# Patient Record
Sex: FEMALE | Race: WHITE | NOT HISPANIC OR LATINO | Employment: OTHER | URBAN - METROPOLITAN AREA
[De-identification: names, ages, dates, MRNs, and addresses within clinical notes are randomized per-mention and may not be internally consistent; named-entity substitution may affect disease eponyms.]

---

## 2017-10-16 ENCOUNTER — NEW PATIENT COMPREHENSIVE (OUTPATIENT)
Dept: URBAN - METROPOLITAN AREA CLINIC 43 | Facility: CLINIC | Age: 82
End: 2017-10-16

## 2017-10-16 DIAGNOSIS — H43.813: ICD-10-CM

## 2017-10-16 DIAGNOSIS — H35.3131: ICD-10-CM

## 2017-10-16 PROCEDURE — G8427 DOCREV CUR MEDS BY ELIG CLIN: HCPCS

## 2017-10-16 PROCEDURE — 92004 COMPRE OPH EXAM NEW PT 1/>: CPT

## 2017-10-16 PROCEDURE — G8785 BP SCRN NO PERF AT INTERVAL: HCPCS

## 2017-10-16 PROCEDURE — 1036F TOBACCO NON-USER: CPT

## 2017-10-16 PROCEDURE — 4177F TALK PT/CRGVR RE AREDS PREV: CPT

## 2017-10-16 PROCEDURE — 2019F DILATED MACUL EXAM DONE: CPT

## 2017-10-16 PROCEDURE — 92015 DETERMINE REFRACTIVE STATE: CPT

## 2017-10-16 ASSESSMENT — VISUAL ACUITY
OD_SC: 20/400
OD_CC: 20/40
OS_CC: 20/30-1
OS_SC: 20/200
OS_CC: J2
OD_CC: J3+1

## 2017-10-16 ASSESSMENT — TONOMETRY
OS_IOP_MMHG: 18
OD_IOP_MMHG: 18

## 2020-09-22 ENCOUNTER — ESTABLISHED COMPREHENSIVE EXAM (OUTPATIENT)
Dept: URBAN - METROPOLITAN AREA CLINIC 43 | Facility: CLINIC | Age: 85
End: 2020-09-22

## 2022-09-19 ENCOUNTER — APPOINTMENT (RX ONLY)
Dept: URBAN - METROPOLITAN AREA CLINIC 148 | Facility: CLINIC | Age: 87
Setting detail: DERMATOLOGY
End: 2022-09-19

## 2022-09-19 PROBLEM — C44.722 SQUAMOUS CELL CARCINOMA OF SKIN OF RIGHT LOWER LIMB, INCLUDING HIP: Status: ACTIVE | Noted: 2022-09-19

## 2022-09-19 PROCEDURE — ? COUNSELING

## 2022-09-19 PROCEDURE — ? ADDITIONAL NOTES

## 2022-09-19 PROCEDURE — 99203 OFFICE O/P NEW LOW 30 MIN: CPT

## 2022-09-19 NOTE — PROCEDURE: ADDITIONAL NOTES
Render Risk Assessment In Note?: yes
Additional Notes: Patient will obtain a copy of pathology results to be sent to our office to determine further treatment options.
Detail Level: Detailed

## 2022-09-19 NOTE — HPI: SKIN LESION
What Type Of Note Output Would You Prefer (Optional)?: Bullet Format
How Severe Is Your Skin Lesion?: mild
Is This A New Presentation, Or A Follow-Up?: Skin Lesion
Additional History: Pt states this lesion was a biopsy proven skin cancer. We will request the medical records from previous dermatologist.